# Patient Record
Sex: MALE | HISPANIC OR LATINO | URBAN - METROPOLITAN AREA
[De-identification: names, ages, dates, MRNs, and addresses within clinical notes are randomized per-mention and may not be internally consistent; named-entity substitution may affect disease eponyms.]

---

## 2023-02-02 ENCOUNTER — OFFICE VISIT (OUTPATIENT)
Dept: FAMILY MEDICINE | Facility: CLINIC | Age: 28
End: 2023-02-02

## 2023-02-02 VITALS
RESPIRATION RATE: 16 BRPM | SYSTOLIC BLOOD PRESSURE: 167 MMHG | DIASTOLIC BLOOD PRESSURE: 97 MMHG | OXYGEN SATURATION: 99 % | TEMPERATURE: 98.9 F | HEIGHT: 65 IN | HEART RATE: 67 BPM

## 2023-02-02 DIAGNOSIS — R03.0 ELEVATED BP WITHOUT DIAGNOSIS OF HYPERTENSION: ICD-10-CM

## 2023-02-02 DIAGNOSIS — L29.9 ITCHY SKIN: Primary | ICD-10-CM

## 2023-02-02 ASSESSMENT — PAIN SCALES - GENERAL: PAINLEVEL: NO PAIN (0)

## 2023-02-02 NOTE — Clinical Note
I have completed my note, please review, add tie in statement and close encounter. Please email me at [email or web address] with questions.   Thanks!   FIDEL CHANDRA

## 2023-02-03 NOTE — PATIENT INSTRUCTIONS
Compare la pomada que recibio tolentino familiar con la pomada de tolentino kari para rogelio si las 2 pomadas son igual para poder saber que es lo que usted esta usando. Si la pomada de tolentino kari es un esteroide, no utilice la pomada por mas de 2 semanas consecutivamente. Si se va noé la price, no mas use jabon nando ves al herminio y no use agua calienta para enjuagarse la price. Para ayudar con la piel seca, puede usar vaselina.

## 2023-02-03 NOTE — NURSING NOTE
"[facility] Nursing Progress Note    Primary Concern: Redness of the nose r/t cold exposure  Onset: 5 months +  Location: External Nose  Duration, timing: Present when outside; in cold.  Severity (0-10): No pain; notes uncomfortably.         Objective:  BP (!) 167/97 (BP Location: Right arm, Patient Position: Sitting)   Pulse 67   Temp 98.9  F (37.2  C)   Resp 16   Ht 1.66 m (5' 5.35\")   SpO2 99%       PHQ Score:    PHQ2: 3    PHQ9: 3    Nutrition Screener:    Q1 Answer: Sometimes true    Q2 Answer: Never true    Referral to Nutrition needed?: Yes; notified.     A 26 y/o male presented to the clinic tonight with complaints of redness surrounding the nose; pt described as allergies. Pt stated that it only occurs when he is out in the cold temperature and has been occurring for 5 months. Pt noted that he has no congestion or respiratory implications; centric to the external skin of the nose. Reports no pain, only discomfort. He has not self-treated. PHQ-9 was complete d/t score of 3 on PHQ-2; PHQ-9 was given directly to MED team d/t time constraints.       Nursing Clinician: Miguel Angel Munoz  Nursing Preceptor:     _____________________________  Preceptor Use Only:  In supervising the student, I have reviewed and verified the student's documentation and found it to be correct and complete.   Preceptor Signature:   "

## 2023-02-03 NOTE — PROGRESS NOTES
MEDICINE NOTE    SUBJECTIVE:  Gudelia Pitts is a 27 year old male with no PMH presenting to clinic today (2/2) with concerns of redness and itching around his nose. He reports this has been happening intermittently since November and is worsened by the cold weather. He describes it as redness on each side of his nose and does not extend to his cheeks, forehead, chin, or eyes. He has not noticed any open lesions, pus, skin bleeding or peeling. He does not have any history of allergies or skin concerns. No dryness or itchiness of the eyes. He does have random episodes of mild epistaxis, but reports that is baseline for him. He had a fever last week (exact date unknown) and has been taking cold medicine for the past 3 days, he is not certain the name of the medication. He does not believe that the cold symptoms are related to his current skin concern. He is not febrile today and has not had coughing, shortness of breath, or chest pain. His uncle gave him a topical cream that he has been using daily, which has reduced redness and itching. He is not sure what the name is, but it is possibly a steroid cream. He takes no additional daily medications and has not recently used any new facial products or other skin products. He mentioned that another family member has been experiencing the same redness and itchiness around the nose this winter.     Interview conducted with [facility] .     REVIEW OF SYSTEMS  Gen: + fevers once last week but afebrile today, no night sweats or chills. Has had some minor weight gain but feels this is due to being less active in the winter.   Eyes: no vision change, dryness, or itchiness  Ears, Noses, Mouth, Throat: + epistaxis randomly during the year but not abnormal per pt., no other nasal discharge, no oral lesions or dryness  Cardiac: no chest pain  Lungs: no dyspnea, cough, or shortness of breath  Skin: + redness on face, see HPI. No sores, open wounds, or peeling  skin.  Allergy: no environmental or drug allergies  Psych: no concerns for depression or anxiety     PMH:   None    Family Hx:  Patient unsure, believes some family members might have diabetes and/or HTN       Social History     Socioeconomic History     Marital status: Not on file     Spouse name: Not on file     Number of children: Not on file     Years of education: Not on file     Highest education level: Not on file   Occupational History     Not on file   Tobacco Use     Smoking status: Not on file     Smokeless tobacco: Not on file   Substance and Sexual Activity     Alcohol use: Not on file     Drug use: Not on file     Sexual activity: Not on file   Other Topics Concern     Not on file   Social History Narrative    2/2/23 Pt was provided contact information in [place] [employer] for low cost dental, food, clothing resources, applying for health insurance, counseling or therapy, english language classes, low cost primary care, low cost medications, low cost dental care, , and job training. [facility] was provided for low cost dental services, [facility] and [facility] [employer] [facility] were provided for low cost/free food services, and [facility] were provided for lost cost clothing and household items. [facility] was provided for applying for health insurance, [facility] was provided as a resource for counseling or therapy, [facility] [employer] [facility] and [employer] were provided for English language classes, and [facility]  was provided for  services.     Social Determinants of Health     Financial Resource Strain: Not on file   Food Insecurity: Not on file   Transportation Needs: Not on file   Physical Activity: Not on file   Stress: Not on file   Social Connections: Not on file   Intimate Partner Violence: Not on file   Housing Stability: Not on file       OBJECTIVE:  Physical Exam:  BP (!)  "167/97 (BP Location: Right arm, Patient Position: Sitting)   Pulse 67   Temp 98.9  F (37.2  C)   Resp 16   Ht 1.66 m (5' 5.35\")   SpO2 99%   Constitutional: no distress, comfortable, pleasant   Ears, Nose and Throat:  nose clear and free of lesions  Respiratory: clear to auscultation, no wheezes or crackles, normal breath sounds   Heart: RRR, no murmurs  Skin: no concerning lesions, no redness noted, no peeling/cracking around nose  Psychological: appropriate mood    ASSESSMENT/PLAN:  Discussion:  Redness and itching noted by patient likely due to dry skin exacerbated by cold weather. No redness was noted on physical examination, patient noted that it does improve when he is in a warm area/indoors. Today's BP reading was elevated, he did mention that is has been elevated in the past but he has never been diagnosed with hypertension. He mentions that he only has his BP measured when he is not feeling well, which could be contributing to the elevated readings in the past and today since he reports feeling cold symptoms.     Diagnoses and all orders for this visit:    Itchy/Red Skin       -      Consistent with dryness due to cold weather       -      Reduce face washing to once daily with a fragrance-free, sensitive skin wash. Advise against bar soap as they can be drying. Avoid hot water. Pat skin dry, no rubbing with a towel. Apply fragrance-free moisturizer as needed, can use Vaseline to seal in moisture.        -      Suspect OTC cream is a topical steroid; however, suggested he compare to family member who was also seen tonight and started on a topical steroid. Did caution to not use the same cream as family member as creams for the body skin are generally stronger than what we use on the face. Patient expressed understanding.       -      Can continue to use the OTC topical cream, use only a thin layer each day for no longer than 14 consecutive days due to risk of skin thinning to allow for skin to have a " "break from active ingredients, especially if it is a steroidal cream.       -      Return to [facility] if condition has not resolved with treatment as outlined, spreads on face or body, or symptoms worsen.    Elevated BP without diagnosis of HTN  -     Home Blood Pressure Monitor Order for DME - ONLY FOR DME  -  Monitor and record BP daily at home, return to [facility] in ~2-3 weeks with BP log for review; if BP is consistently elevated, consider management with medication. Dietary consult with nutrition would also be appropriate.    Positive PHQ2         -     PHQ9 was = 3 which is in the \"normal or mild depression\" range.         -     Patient did not express concerns of depression, anxiety, or other mental health concerns during today's interview; inform patient of social work services available at [facility] if he is in need of support.     Med Clinician: Ariadne Perdomo, MS2  Preceptor: Felicia Alex MD    In supervising the medical student, I repeated the exam documented above.  I have reviewed and verified the student s documentation.  Supervising Provider: Felicia Alex MD      "

## 2023-02-04 PROBLEM — R03.0 ELEVATED BP WITHOUT DIAGNOSIS OF HYPERTENSION: Status: ACTIVE | Noted: 2023-02-04

## 2023-02-04 RX ORDER — HYDROPHILIC CREAM
OINTMENT (GRAM) TOPICAL DAILY PRN
COMMUNITY

## 2023-02-04 NOTE — PROGRESS NOTES
[facility] Progress Note    Chief complaint: Nose redness and itchiness    Last date of full med: 2/2/2023    Subjective:  Gudelia is a 25 year old male that presents to [facility] with concerns about redness and itching around his nose. He reports this has been happening intermittently since November and is worsened by the cold weather. He describes it as redness on each side of his nose and does not extend to his cheeks, forehead, chin, or eyes. He has not noticed any open lesions, pus, skin bleeding or peeling. He does not have any history of allergies or skin concerns. He rated the itchiness a 3/10 and has used an unknown white anti-itch over the counter ointment, possibly a steroid cream, to treat it 5 times. When he would use it, the itchiness would go down to a 1/10. He mentioned that another family member has been experiencing the same redness and itchiness around the nose this winter.     He had a fever last week (exact date unknown) and has been taking cold medicine for the past 3 days, he is not certain the name of the medication. He  stated that it was helpful in treating the symptoms. He does not believe that the cold symptoms are related to his current skin concern.      Today, at [facility] he had elevated blood pressure readings of 167/97 and reports taking no hypertension medications. However, he expressed interest in receiving a blood pressure cuff for at home monitoring. Patient reports no allergies or adverse drug reactions, and is not up to date on his vaccinations. He reports needing his annual flu shot. He reports not taking any other prescription medications, over the counter medications, topicals, or herbal supplements.    Social history: The patient reports drinking 3 cups of caffeine a day and 3 beers a week on the weekends. The patient reports no use of tobacco or social drugs.      Prior to Admission medications    Medication Sig Last Dose Taking? Auth Provider Long Term End Date   Emollient (OINTMENT  "BASE) OINT Externally apply topically daily as needed (redness and itchiness) Taking Yes Reported, Patient     Nutritional Supplements (COLD AND FLU PO) Take 1 capsule by mouth daily as needed (cold) Taking Yes Reported, Patient           Objective:  BP (!) 167/97 (BP Location: Right arm, Patient Position: Sitting)   Pulse 67   Temp 98.9  F (37.2  C)   Resp 16   Ht 1.66 m (5' 5.35\")   SpO2 99%         Assessment:     Nose redness and itchiness: Uncontrolled  DTP: Needs Additional Therapy: untreated condition   Rationale: Patient is still experiencing nose redness and itchiness after self treatment. Per medical preceptor, the patient should use a facial cleanser, moisturizer, and vaseline to alleviate redness.    Hypertension: Uncontrolled   DTP: Needs Additional Therapy: untreated condition  Rationale: Patient has some elevated blood pressure readings above 140/90 mmHg. Per the 2017 ACC/AHA hypertension guidelines, the patient should start 2 first line agents to get to a goal of < 130/80 mmHg. Due to lack of previous blood pressure readings at [facility], per medical preceptor, the patient should start with at home blood pressure monitoring to determine regular, at home blood pressure readings.       Plan:  1. Start using fragrance free facial cleanser and moisturizer once daily for nose redness  2. Start applying vaseline topically to nose once daily as needed after facial cleanser and moisturizer.  3. Educate on steroid ointment use and compare his family member s steroid ointment from [facility] today to the previously used ointment   4. Start monitoring at home blood pressure readings  5. Continue taking cold medication until symptoms improve  6. Monitor cold for worsening symptoms (shortness of breath and fever)  7. Educate on at home COVID-19 testing to determine if cold symptoms could be from COVID or something else.       Follow-Up:  Follow up 2-4 weeks to monitor effectiveness (symptom improvement) and safety " (worsening itchiness, redness, rash movement) of facial cleasner and moisturizing regimen. Also follow up on at home blood pressure readings and improvement of cold symptoms.    Pharmacy Clinician: Marie Bryant  Pharmacy Preceptor: Rob Barbour    _____________________________  Preceptor Use Only:  In supervising the student, I have reviewed and verified the student's documentation and found it to be correct and complete.   Preceptor Signature: